# Patient Record
Sex: MALE | Race: WHITE
[De-identification: names, ages, dates, MRNs, and addresses within clinical notes are randomized per-mention and may not be internally consistent; named-entity substitution may affect disease eponyms.]

---

## 2019-11-05 ENCOUNTER — HOSPITAL ENCOUNTER (EMERGENCY)
Dept: HOSPITAL 97 - ER | Age: 39
Discharge: HOME | End: 2019-11-05
Payer: SELF-PAY

## 2019-11-05 VITALS — OXYGEN SATURATION: 100 %

## 2019-11-05 VITALS — DIASTOLIC BLOOD PRESSURE: 89 MMHG | SYSTOLIC BLOOD PRESSURE: 161 MMHG

## 2019-11-05 VITALS — TEMPERATURE: 97.9 F

## 2019-11-05 DIAGNOSIS — I10: ICD-10-CM

## 2019-11-05 DIAGNOSIS — M54.16: Primary | ICD-10-CM

## 2019-11-05 DIAGNOSIS — F17.210: ICD-10-CM

## 2019-11-05 LAB
ALBUMIN SERPL BCP-MCNC: 3.6 G/DL (ref 3.4–5)
ALP SERPL-CCNC: 82 U/L (ref 45–117)
ALT SERPL W P-5'-P-CCNC: 23 U/L (ref 12–78)
AST SERPL W P-5'-P-CCNC: 14 U/L (ref 15–37)
BUN BLD-MCNC: 14 MG/DL (ref 7–18)
GLUCOSE SERPLBLD-MCNC: 94 MG/DL (ref 74–106)
HCT VFR BLD CALC: 49.1 % (ref 39.6–49)
LYMPHOCYTES # SPEC AUTO: 3.2 K/UL (ref 0.7–4.9)
PMV BLD: 8 FL (ref 7.6–11.3)
POTASSIUM SERPL-SCNC: 3.8 MMOL/L (ref 3.5–5.1)
RBC # BLD: 5.62 M/UL (ref 4.33–5.43)

## 2019-11-05 PROCEDURE — 99284 EMERGENCY DEPT VISIT MOD MDM: CPT

## 2019-11-05 PROCEDURE — 80048 BASIC METABOLIC PNL TOTAL CA: CPT

## 2019-11-05 PROCEDURE — 96374 THER/PROPH/DIAG INJ IV PUSH: CPT

## 2019-11-05 PROCEDURE — 85025 COMPLETE CBC W/AUTO DIFF WBC: CPT

## 2019-11-05 PROCEDURE — 72131 CT LUMBAR SPINE W/O DYE: CPT

## 2019-11-05 PROCEDURE — 80076 HEPATIC FUNCTION PANEL: CPT

## 2019-11-05 PROCEDURE — 36415 COLL VENOUS BLD VENIPUNCTURE: CPT

## 2019-11-05 PROCEDURE — 96375 TX/PRO/DX INJ NEW DRUG ADDON: CPT

## 2019-11-05 NOTE — RAD REPORT
EXAM DESCRIPTION:  CT - Spine Lumbar Wo Con - 11/5/2019 4:13 pm

 

CLINICAL HISTORY:  Back pain, right lower extremity radiculopathy

 

COMPARISON:  None.

 

TECHNIQUE:  Thin section axial imaging of the lumbar spine was performed.  Sagittal and coronal recon
struction images were generated and reviewed.

 

All CT scans are performed using dose optimization technique as appropriate and may include automated
 exposure control or mA/KV adjustment according to patient size.

 

FINDINGS:  Lumbar bodies are normal in height and normal in alignment. No lytic, sclerotic or expansi
le destructive process. No paraspinal soft tissue mass.

 

Central canal detail is inherently limited.

 

L1-2 level shows minimal disc bulge. No canal or foramen stenosis. Facet degenerative change present.


 

L2-3 level shows mild broad-based bulging of disc material. No canal or foramen stenosis. Facet degen
erative changes mild.

 

L3-4 level shows mild circumferential bulging of disc material. No canal or foramen stenosis. Mild fa
cet degenerative change.

 

L4-5 level shows large broad-based protrusion of disc material across the central canal and into each
 exit foramen. The protruding disc is calcified. No central spinal stenosis. Mild bilateral foraminal
 stenosis changes are present from bulging disc material and facet degenerative change.

 

L5-S1 large disc herniation. Disc herniation is calcified. There is no central spinal stenosis. Mild 
mass effect on the bilateral S1 nerve root seen. Foraminal stenosis is present from bulging disc mate
rial and facet hypertrophy.

 

IMPRESSION:  L5-S1 large midline disc herniation. The disc is calcified indicating chronicity. No kedar
tral spinal stenosis.

 

Prominent protruding disc in the midline with calcification also indicating long-standing. No central
 spinal stenosis.

 

L4-5 and L5-S1 foraminal encroachment changes from disc bulge, endplate spurring and facet degenerati
ve change.

## 2019-11-05 NOTE — EDPHYS
Physician Documentation                                                                           

 [facility]                                                                 

Name: Kain Hernandes                                                                                  

Age: 39 yrs                                                                                       

Sex: Male                                                                                         

: 1980                                                                                   

MRN: [id number]                                                                                   

Arrival Date: 2019                                                                          

Time: 14:47                                                                                       

Account#: [id number]                                                                            

Bed 18                                                                                            

Private MD:                                                                                       

ED Physician Gopal West                                                                      

HPI:                                                                                              

                                                                                             

18:37 This 39 yrs old  Male presents to ER via Wheelchair with complaints of Back    wa  

      Pain, Leg Pain.                                                                             

18:38 The patient presents with pain that is acute, with no known mechanism of injury. The    wa  

      symptoms are located in the low back, right. Onset: The symptoms/episode began/occurred     

      3 day(s) ago. The pain radiates to the right leg. Associated signs and symptoms:            

      Pertinent positives: numbness, tingling, Pertinent negatives: abdominal pain, dysuria,      

      hematuria, urinary retention, weakness. The problem was sustained from unknown cause.       

      Modifying factors: The patient symptoms are alleviated by nothing, the patient symptoms     

      are aggravated by movement, walking. Severity of symptoms: At their worst the symptoms      

      were severe, in the emergency department the symptoms are unchanged. The patient has        

      not experienced similar symptoms in the past. The patient has not recently seen a           

      physician. denies incontinence. denies inability ambulating.                                

                                                                                                  

Historical:                                                                                       

- Allergies:                                                                                      

14:51 No Known Allergies;                                                                     hb  

- Home Meds:                                                                                      

14:51 losartan oral oral [Active];                                                            hb  

- PMHx:                                                                                           

14:51 Hypertension;                                                                           hb  

- PSHx:                                                                                           

14:51 None;                                                                                   hb  

                                                                                                  

- Immunization history:: Adult Immunizations up to date.                                          

- Social history:: Smoking status: Patient uses tobacco products, smokes one pack                 

  cigarettes per day.                                                                             

- Ebola Screening: : No symptoms or risks identified at this time.                                

- Family history:: not pertinent.                                                                 

- Hospitalizations: : No recent hospitalization is reported.                                      

                                                                                                  

                                                                                                  

ROS:                                                                                              

18:40 Constitutional: Negative for fever, chills, and weight loss, Eyes: Negative for injury, wa  

      pain, redness, and discharge, ENT: Negative for injury, pain, and discharge, Neck:          

      Negative for injury, pain, and swelling, Cardiovascular: Negative for chest pain,           

      palpitations, and edema, Respiratory: Negative for shortness of breath, cough,              

      wheezing, and pleuritic chest pain, Abdomen/GI: Negative for abdominal pain, nausea,        

      vomiting, diarrhea, and constipation, : Negative for injury, bleeding, discharge, and     

      swelling, MS/Extremity: Negative for injury and deformity, Skin: Negative for injury,       

      rash, and discoloration, Psych: Negative for depression, anxiety, suicide ideation,         

      homicidal ideation, and hallucinations.                                                     

18:40 All other systems are negative.                                                             

18:41 Back: Positive for pain at rest, pain with movement, radiated pain, of the right low    wa  

      back and right gluteus steve.                                                             

                                                                                                  

Exam:                                                                                             

18:41 Constitutional:  This is a well developed, well nourished patient who is awake, alert,  wa  

      and in no acute distress. Head/Face:  Normocephalic, atraumatic. Eyes:  Pupils equal        

      round and reactive to light, extra-ocular motions intact.  Lids and lashes normal.          

      Conjunctiva and sclera are non-icteric and not injected.  Cornea within normal limits.      

      Periorbital areas with no swelling, redness, or edema. ENT:  Nares patent. No nasal         

      discharge, no septal abnormalities noted.  Tympanic membranes are normal and external       

      auditory canals are clear.  Oropharynx with no redness, swelling, or masses, exudates,      

      or evidence of obstruction, uvula midline.  Mucous membranes moist. Neck:  Trachea          

      midline, no thyromegaly or masses palpated, and no cervical lymphadenopathy.  Supple,       

      full range of motion without nuchal rigidity, or vertebral point tenderness.  No            

      Meningismus. Chest/axilla:  Normal chest wall appearance and motion.  Nontender with no     

      deformity.  No lesions are appreciated. Cardiovascular:  Regular rate and rhythm with a     

      normal S1 and S2.  No gallops, murmurs, or rubs.  Normal PMI, no JVD.  No pulse             

      deficits. Respiratory:  Lungs have equal breath sounds bilaterally, clear to                

      auscultation and percussion.  No rales, rhonchi or wheezes noted.  No increased work of     

      breathing, no retractions or nasal flaring. Abdomen/GI:  Soft, non-tender, with normal      

      bowel sounds.  No distension or tympany.  No guarding or rebound.  No evidence of           

      tenderness throughout. Male :  Normal genitalia with no discharge or lesions. Skin:       

      Warm, dry with normal turgor.  Normal color with no rashes, no lesions, and no evidence     

      of cellulitis. MS/ Extremity:  Pulses equal, no cyanosis.  Neurovascular intact.  Full,     

      normal range of motion.                                                                     

18:41 Back: pain, that is moderate, of the  right low back and right gluteus steve, CVA         

      tenderness, is absent, muscle spasm, is not present, Straight leg raises: partially         

      positive on the R?.                                                                         

18:41 Neuro: RLE numbness, tingling.                                                              

                                                                                                  

Vital Signs:                                                                                      

14:51  / 119; Pulse 98; Resp 20; Temp 97.9; Pulse Ox 100% on R/A; Weight 136.08 kg;     hb  

      Height 6 ft. 3 in. (190.50 cm); Pain 10/10;                                                 

16:40  / 111; Pulse 65; Resp 17 S; Pulse Ox 95% on R/A;                                 ca1 

17:32  / 91; Pulse 74; Resp 16 S; Pulse Ox 100% on R/A;                                 ca1 

18:10  / 89; Pulse 81; Resp 17 S; Pulse Ox 100% on R/A; Pain 5/10;                      ca1 

14:51 Body Mass Index 37.50 (136.08 kg, 190.50 cm)                                            hb  

                                                                                                  

MDM:                                                                                              

14:55 Patient medically screened.                                                             wa  

18:45 Differential diagnosis: Ligament Injury sprain, sciatica. lumbar canal stenosis, disk   wa  

      herniation, nerve root impingement. Data reviewed: vital signs, nurses notes, lab test      

      result(s), radiologic studies.                                                              

18:47 Test interpretation: by ED physician or midlevel provider: lumbar spine CT: multi-level wa  

      disk herniation. no significant canal stenosis.                                             

18:49 Test interpretation: by ED physician or midlevel provider: labs noted essentially       wa  

      within nml limits. Response to treatment: the patient's symptoms have markedly improved     

      after treatment. ED course: received meds for pain in ED with significant relief. d/c'd     

      with close f/u for MRI and further eval. advised return for worsening concerns.             

                                                                                                  

                                                                                             

15:57 Order name: Basic Metabolic Panel; Complete Time: 17:08                                 wa  

                                                                                             

15:57 Order name: CBC with Diff; Complete Time: 17:09                                         wa  

                                                                                             

15:54 Order name: CT Lumbar Spine Wo Con; Complete Time: 17:09                                wa  

                                                                                             

15:57 Order name: Hepatic Function; Complete Time: 17:                                      wa  

                                                                                             

15:54 Order name: IV Start; Complete Time: 16:12                                              wa  

                                                                                             

15:57 Order name: Labs collected and sent; Complete Time: 16:23                               wa  

                                                                                                  

Administered Medications:                                                                         

16:00 Drug: Decadron - Dexamethasone 10 mg Route: IVP; Site: right antecubital;               ca1 

16:23 Follow up: Response: No adverse reaction; Pain is decreased                             ca1 

16:02 Drug: TORadol 30 mg Route: IVP; Site: right antecubital;                                ca1 

16:23 Follow up: Response: No adverse reaction; Pain is decreased                             ca1 

16:05 Drug: Valium 5 mg Route: IVP; Site: right antecubital;                                  ca1 

16:23 Follow up: Response: No adverse reaction; Pain is decreased                             ca1 

17:15 Drug: fentaNYL (PF) 100 mcg {Note: RASS - 0.} Route: IVP; Site: right antecubital;      ca1 

17:45 Follow up: Response: No adverse reaction; Pain is decreased; RASS: Alert and Calm (0)   ca1 

                                                                                                  

                                                                                                  

Disposition:                                                                                      

19 18:01 Discharged to Home. Impression: acute right lower back pain with radiculopathy.    

- Condition is Stable.                                                                            

                                                                                                  

- Prescriptions for Valium 5 mg Oral Tablet - take 1 tablet by ORAL route At bedtime As           

  needed; 5 tablet. Prednisone 20 mg Oral Tablet - take 2 tablet by ORAL route once               

  daily for 5 days; 10 tablet. Ibuprofen 600 mg Oral Tablet - take 1 tablet by ORAL               

  route every 6 hours As needed take with food; 30 tablet.                                        

- Medication Reconciliation Form, Thank You Letter, Antibiotic Education, Prescription            

  Opioid Use, Work release form form.                                                             

- Follow up: Olvin Watts MD; When: 1 - 2 days.                                             

- Problem is new.                                                                                 

- Symptoms have improved.                                                                         

- Notes: follow up with the bone doctor as discussed. you will need an MRI for further            

  evaluation of your pain. return for any rapidly worsening concerns you may have                 

                                                                                                  

                                                                                                  

Signatures:                                                                                       

Dispatcher [employer]                           EDCinthia Arechiga RN                     RN                                                      

Gopal eWst MD MD wa Acob, Cheryl, RN                        RN   ca1                                                  

                                                                                                  

Corrections: (The following items were deleted from the chart)                                    

18:19 18:01 2019 18:01 Discharged to Home. Impression: acute right lower back pain with ca1 

      radiculopathy. Condition is Stable. Forms are Medication Reconciliation Form, Thank You     

      Letter, Antibiotic Education, Prescription Opioid Use. Follow up: Olvin Watts;         

      When: 1 - 2 days. Problem is new. Symptoms have improved. wa                                

                                                                                                  

**************************************************************************************************

## 2019-11-05 NOTE — ER
Nurse's Notes                                                                                     

 [facility]                                                                 

Name: Kain Hernandes                                                                                  

Age: 39 yrs                                                                                       

Sex: Male                                                                                         

: 1980                                                                                   

MRN: [id number]                                                                                   

Arrival Date: 2019                                                                          

Time: 14:47                                                                                       

Account#: [id number]                                                                            

Bed 18                                                                                            

Private MD:                                                                                       

Diagnosis: acute right lower back pain with radiculopathy                                         

                                                                                                  

Presentation:                                                                                     

                                                                                             

14:48 Presenting complaint: Right low back pain that radiates to right leg x 3 days.          hb  

      Transition of care: patient was not received from another setting of care. Onset of         

      symptoms was 2019. Risk Assessment: Do you want to hurt yourself or            

      someone else? Patient reports no desire to harm self or others. Care prior to arrival:      

      None.                                                                                       

14:48 Method Of Arrival: Wheelchair                                                           hb  

14:48 Acuity: ALE 3                                                                           hb  

15:21 Initial Sepsis Screen: Does the patient meet any 2 criteria? No. Patient's initial      ca1 

      sepsis screen is negative. Does the patient have a suspected source of infection? No.       

      Patient's initial sepsis screen is negative.                                                

                                                                                                  

Historical:                                                                                       

- Allergies:                                                                                      

14:51 No Known Allergies;                                                                     hb  

- Home Meds:                                                                                      

14:51 losartan oral oral [Active];                                                            hb  

- PMHx:                                                                                           

14:51 Hypertension;                                                                           hb  

- PSHx:                                                                                           

14:51 None;                                                                                   hb  

                                                                                                  

- Immunization history:: Adult Immunizations up to date.                                          

- Social history:: Smoking status: Patient uses tobacco products, smokes one pack                 

  cigarettes per day.                                                                             

- Ebola Screening: : No symptoms or risks identified at this time.                                

- Family history:: not pertinent.                                                                 

- Hospitalizations: : No recent hospitalization is reported.                                      

                                                                                                  

                                                                                                  

Screening:                                                                                        

15:18 Abuse screen: Denies threats or abuse. Denies injuries from another. Nutritional        ca1 

      screening: No deficits noted. Tuberculosis screening: No symptoms or risk factors           

      identified. Fall Risk None identified.                                                      

                                                                                                  

Assessment:                                                                                       

15:18 General: Appears in no apparent distress. uncomfortable, Behavior is calm, cooperative, ca1 

      appropriate for age. Pain: Complains of pain in right lower back and right gluteus          

      steve Pain radiates to right leg Pain currently is 10 out of 10 on a pain scale.          

      Quality of pain is described as sharp, shooting, Pain began 2-3 days ago. Is                

      continuous, Aggravated by weight bearing. Neuro: Level of Consciousness is awake,           

      alert, obeys commands, Oriented to person, place, time, situation, Appropriate for age.     

      Cardiovascular: Heart tones S1 S2 present Capillary refill < 3 seconds Patient's skin       

      is warm and dry. Cardiovascular: Pulses are all present. Cardiovascular: Edema is           

      absent. Respiratory: Airway is patent Respiratory effort is even, unlabored,                

      Respiratory pattern is regular, symmetrical. GI: Abdomen is round non-distended, Bowel      

      sounds present X 4 quads. Abd is soft and non tender X 4 quads. : No deficits noted.      

      No signs and/or symptoms were reported regarding the genitourinary system. EENT: No         

      deficits noted. No signs and/or symptoms were reported regarding the EENT system. Derm:     

      Skin is intact, is healthy with good turgor, Skin is pink, warm \T\ dry. Musculoskeletal:   

      Circulation, motion, and sensation intact. Capillary refill < 3 seconds, Range of           

      motion: intact in all extremities.                                                          

16:05 Reassessment: Patient appears in no apparent distress at this time. Patient and/or      ca1 

      family updated on plan of care and expected duration. Pain level reassessed. Patient is     

      alert, oriented x 3, equal unlabored respirations, skin warm/dry/pink.                      

17:20 Reassessment: Patient appears in no apparent distress at this time. Patient is alert,   ca1 

      oriented x 3, equal unlabored respirations, skin warm/dry/pink. Pt c/o pain. Notified       

      provider. Orders given.                                                                     

17:53 Reassessment: Dr. Wets at bedside.                                                    ca1 

18:10 Reassessment: Patient appears in no apparent distress at this time. Patient is alert,   ca1 

      oriented x 3, equal unlabored respirations, skin warm/dry/pink. Patient states feeling      

      better.                                                                                     

                                                                                                  

Vital Signs:                                                                                      

14:51  / 119; Pulse 98; Resp 20; Temp 97.9; Pulse Ox 100% on R/A; Weight 136.08 kg;     hb  

      Height 6 ft. 3 in. (190.50 cm); Pain 10/10;                                                 

16:40  / 111; Pulse 65; Resp 17 S; Pulse Ox 95% on R/A;                                 ca1 

17:32  / 91; Pulse 74; Resp 16 S; Pulse Ox 100% on R/A;                                 ca1 

18:10  / 89; Pulse 81; Resp 17 S; Pulse Ox 100% on R/A; Pain 5/10;                      ca1 

14:51 Body Mass Index 37.50 (136.08 kg, 190.50 cm)                                              

                                                                                                  

ED Course:                                                                                        

14:47 Patient arrived in ED.                                                                  mr  

14:50 Triage completed.                                                                       hb  

14:51 Arm band placed on.                                                                     hb  

14:54 vE Vila RN is Primary Nurse.                                                      ca1 

14:55 Gopal West MD is Attending Physician.                                             wa  

15:18 Patient has correct armband on for positive identification. Bed in low position. Call   ca1 

      light in reach. Side rails up X 1. Pulse ox on. NIBP on. Warm blanket given.                

15:18 No provider procedures requiring assistance completed.                                  ca1 

16:00 Inserted saline lock: 20 gauge in right antecubital area, using aseptic technique.      ca1 

16:14 CT Lumbar Spine Wo Con In Process Unspecified.                                          EDMS

18:01 Olvin Watts MD is Referral Physician.                                            wa  

18:18 IV discontinued, intact, bleeding controlled, No redness/swelling at site. Pressure     ca1 

      dressing applied.                                                                           

                                                                                                  

Administered Medications:                                                                         

16:00 Drug: Decadron - Dexamethasone 10 mg Route: IVP; Site: right antecubital;               ca1 

16:23 Follow up: Response: No adverse reaction; Pain is decreased                             ca1 

16:02 Drug: TORadol 30 mg Route: IVP; Site: right antecubital;                                ca1 

16:23 Follow up: Response: No adverse reaction; Pain is decreased                             ca1 

16:05 Drug: Valium 5 mg Route: IVP; Site: right antecubital;                                  ca1 

16:23 Follow up: Response: No adverse reaction; Pain is decreased                             ca1 

17:15 Drug: fentaNYL (PF) 100 mcg {Note: RASS - 0.} Route: IVP; Site: right antecubital;      ca1 

17:45 Follow up: Response: No adverse reaction; Pain is decreased; RASS: Alert and Calm (0)   ca1 

                                                                                                  

                                                                                                  

Outcome:                                                                                          

18:01 Discharge ordered by MD.                                                                wa  

18:18 Discharged to home via wheelchair, with family.                                         ca1 

18:18 Condition: stable                                                                           

18:18 Discharge instructions given to patient, Instructed on discharge instructions, follow       

      up and referral plans. no drinking with medication, no driving heavy equipment,             

      medication usage, Demonstrated understanding of instructions, follow-up care,               

      medications, Prescriptions given X 3.                                                       

18:19 Patient left the ED.                                                                    ca1 

                                                                                                  

Signatures:                                                                                       

Dispatcher [employer]                           [facility]                                                 

Sadie Fan Heather, RN RN                                                      

Brett, MD MD tapan Herron Cheryl RN                        RN   ca1                                                  

                                                                                                  

Corrections: (The following items were deleted from the chart)                                    

18:18 18:10  / 89; Pulse 81bpm; Resp 17bpm; Spontaneous; Pulse Ox 100% RA; Pain 6/10;   ca1 

      ca1                                                                                         

                                                                                                  

**************************************************************************************************

## 2019-11-07 ENCOUNTER — HOSPITAL ENCOUNTER (EMERGENCY)
Dept: HOSPITAL 97 - ER | Age: 39
Discharge: HOME | End: 2019-11-07
Payer: SELF-PAY

## 2019-11-07 VITALS — SYSTOLIC BLOOD PRESSURE: 171 MMHG | DIASTOLIC BLOOD PRESSURE: 105 MMHG | OXYGEN SATURATION: 100 %

## 2019-11-07 VITALS — TEMPERATURE: 97 F

## 2019-11-07 DIAGNOSIS — M54.41: Primary | ICD-10-CM

## 2019-11-07 PROCEDURE — 96365 THER/PROPH/DIAG IV INF INIT: CPT

## 2019-11-07 PROCEDURE — 99283 EMERGENCY DEPT VISIT LOW MDM: CPT

## 2019-11-07 PROCEDURE — 96375 TX/PRO/DX INJ NEW DRUG ADDON: CPT

## 2019-11-07 NOTE — ER
Nurse's Notes                                                                                     

 [facility]                                                                 

Name: Kain Hernandes                                                                                  

Age: 39 yrs                                                                                       

Sex: Male                                                                                         

: 1980                                                                                   

MRN: [id number]                                                                                   

Arrival Date: 2019                                                                          

Time: 10:40                                                                                       

Account#: [id number]                                                                            

Bed 8                                                                                             

Private MD:                                                                                       

Diagnosis: Lumbago with sciatica, right side                                                      

                                                                                                  

Presentation:                                                                                     

                                                                                             

10:57 Presenting complaint: Patient states: right buttock pain that radiates down to the RLE, sv  

      reports being seen here 2 days ago for the same thing, denies injury or fall. Reports       

      that he went to the restroom and sat down and when he sat back up the pain started          

      again. Denies urinary problems. Transition of care: patient was not received from           

      another setting of care. Onset of symptoms was 2019. Risk Assessment: Do       

      you want to hurt yourself or someone else? Patient reports no desire to harm self or        

      others. Care prior to arrival: None.                                                        

10:57 Method Of Arrival: Wheelchair                                                           sv  

10:57 Acuity: ALE 3                                                                           sv  

16:00 Initial Sepsis Screen: Does the patient meet any 2 criteria? No. Patient's initial      bp  

      sepsis screen is negative. Does the patient have a suspected source of infection? No.       

      Patient's initial sepsis screen is negative.                                                

                                                                                                  

Triage Assessment:                                                                                

10:57 General: Appears in no apparent distress. uncomfortable, obese, Behavior is             sv  

      cooperative, restless. Pain: Complains of pain in right gluteus steve Pain radiates       

      to right leg. Neuro: Level of Consciousness is awake, alert, obeys commands.                

      Respiratory: Respiratory effort is even, unlabored.                                         

                                                                                                  

Historical:                                                                                       

- Allergies:                                                                                      

10:59 No Known Allergies;                                                                     sv  

- PMHx:                                                                                           

10:59 Hypertension;                                                                           sv  

- PSHx:                                                                                           

10:59 None;                                                                                   sv  

                                                                                                  

- Immunization history:: Adult Immunizations up to date.                                          

- Social history:: Smoking status: Patient/guardian denies using tobacco.                         

- Ebola Screening: : No symptoms or risks identified at this time.                                

                                                                                                  

                                                                                                  

Screenin:55 Abuse screen: Denies threats or abuse. Denies injuries from another. Nutritional        aj1 

      screening: No deficits noted. Tuberculosis screening: No symptoms or risk factors           

      identified.                                                                                 

16:00 Fall Risk None identified.                                                              bp  

                                                                                                  

Assessment:                                                                                       

11:55 General: Appears uncomfortable, Behavior is cooperative, agitated, restless. Pain:      aj1 

      Complains of pain in right gluteus steve Pain radiates to right leg Pain currently is     

      10 out of 10 on a pain scale. Neuro: Level of Consciousness is awake, alert, obeys          

      commands, Oriented to person, place, time, situation. Cardiovascular: Patient's skin is     

      warm and dry. Respiratory: Airway is patent Respiratory effort is even, unlabored,          

      Respiratory pattern is regular, symmetrical. GI: No signs and/or symptoms were reported     

      involving the gastrointestinal system. : No signs and/or symptoms were reported           

      regarding the genitourinary system. EENT: No signs and/or symptoms were reported            

      regarding the EENT system. Derm: No signs and/or symptoms reported regarding the            

      dermatologic system. Skin is pink, warm \T\ dry. normal. Musculoskeletal: Circulation,      

      motion, and sensation intact. Range of motion: intact in all extremities, Denies recent     

      injury.                                                                                     

12:55 Reassessment: Patient and/or family updated on plan of care and expected duration. Pain aj1 

      level reassessed. Patient is resting comfortably, awakens easily to tactile stimuli.        

      Patient states feeling better. Patient states symptoms have improved.                       

13:55 Reassessment: Patient and/or family updated on plan of care and expected duration. Pain aj1 

      level reassessed. General: Appears in no apparent distress. comfortable, Behavior is        

      calm, cooperative, appropriate for age. Neuro: Level of Consciousness is awake, alert,      

      obeys commands, Oriented to person, place, time, situation. Cardiovascular: Patient's       

      skin is warm and dry. Respiratory: Airway is patent Respiratory effort is even,             

      unlabored, Respiratory pattern is regular, symmetrical. Derm: No signs and/or symptoms      

      reported regarding the dermatologic system. Skin is pink, warm \T\ dry. normal.             

      Musculoskeletal: Circulation, motion, and sensation intact.                                 

15:01 Reassessment: Patient appears in no apparent distress at this time. No changes from     aj1 

      previously documented assessment. Patient and/or family updated on plan of care and         

      expected duration. Pain level reassessed. Patient is alert, oriented x 3, equal             

      unlabored respirations, skin warm/dry/pink.                                                 

15:59 Reassessment: PT D/C HOME VIA W/C, DX WITH LUMBAGO.                                     bp  

                                                                                                  

Vital Signs:                                                                                      

10:59  / 128; Pulse 89; Resp 24; Temp 97; Pulse Ox 98% ; Weight 136.08 kg; Height 6 ft. sv  

      3 in. (190.50 cm);                                                                          

11:55  / 121; Pulse 77; Resp 18; Pulse Ox 97% on R/A;                                   aj1 

13:27  / 94; Pulse 53; Resp 16 S; Pulse Ox 96% ;                                        aj1 

14:30  / 105; Pulse 64; Resp 18; Pulse Ox 100% on R/A;                                  aj1 

10:59 Body Mass Index 37.50 (136.08 kg, 190.50 cm)                                            sv  

                                                                                                  

ED Course:                                                                                        

10:40 Patient arrived in ED.                                                                  as  

10:58 Triage completed.                                                                       sv  

10:59 Arm band placed on.                                                                     sv  

11:52 Ronald Morales PA is PHCP.                                                               [id number] 

11:52 Bhupendra Rome MD is Attending Physician.                                              [id number] 

11:55 Maria Isabel Jang RN is Primary Nurse.                                                   aj1 

11:55 Patient has correct armband on for positive identification. Bed in low position. Call   aj1 

      light in reach. Side rails up X 1.                                                          

11:55 No provider procedures requiring assistance completed.                                  aj1 

12:00 Inserted saline lock: 20 gauge in right antecubital area, using aseptic technique.      aj1 

15:59 IV discontinued, intact, bleeding controlled, No redness/swelling at site. Pressure     bp  

      dressing applied.                                                                           

                                                                                                  

Administered Medications:                                                                         

12:01 Not Given (Physician Discretion): Valium 2 mg IVP once                                  [id number] 

12:11 Drug: TORadol - Ketorolac 15 mg Route: IVP; Site: right antecubital;                    aj1 

16:01 Follow up: Response: Pain is decreased                                                  bp  

12:11 Drug: Decadron - Dexamethasone 10 mg Route: IVP; Site: right antecubital;               aj1 

16:01 Follow up: Response: No adverse reaction                                                bp  

12:11 Drug: fentaNYL (PF) 100 mcg Route: IVP; Site: right antecubital;                        aj1 

16:01 Follow up: Response: Pain is decreased                                                  bp  

12:22 Drug: Robaxin 1 grams Route: IVPB; Infused Over: 1 hrs; Site: right antecubital;        aj1 

13:00 Follow up: IV Status: Completed infusion; IV Intake: 50ml                               bp  

14:42 Drug: fentaNYL (PF) 100 mcg {Note: RASS score 0 patient is alert.} Route: IVP; Site:    aj1 

      right antecubital;                                                                          

16:01 Follow up: Response: Pain is decreased                                                  bp  

14:43 Drug: Gabapentin 600 mg Route: PO;                                                      aj1 

16:02 Follow up: Response: No adverse reaction                                                bp  

                                                                                                  

                                                                                                  

Intake:                                                                                           

13:00 IV: 50ml; Total: 50ml.                                                                  bp  

                                                                                                  

Outcome:                                                                                          

15:39 Discharge ordered by MD. ellis 

15:59 Discharged to home via wheelchair.                                                      bp  

15:59 Condition: stable                                                                           

15:59 Discharge instructions given to patient, Instructed on discharge instructions, follow       

      up and referral plans. medication usage, Demonstrated understanding of instructions,        

      follow-up care, medications, Prescriptions given X 1.                                       

16:02 Patient left the ED.                                                                    sv  

                                                                                                  

Signatures:                                                                                       

Maria Isabel Jang RN                     RN   aj1                                                  

Rebecca Cedeño RN                    RN   sv                                                   

Mitzi Guido Josh, PA                        PA   jr8                                                  

Christopher Pepe RN                      RN   bp                                                   

                                                                                                  

Corrections: (The following items were deleted from the chart)                                    

10:59 10:57 Acuity: ALE 4 sv                                                                  sv  

11:00 10:57 Acuity: ALE 2 sv                                                                  sv  

                                                                                                  

**************************************************************************************************

## 2019-11-07 NOTE — EDPHYS
Physician Documentation                                                                           

 [facility]                                                                 

Name: Kain Hernandes                                                                                  

Age: 39 yrs                                                                                       

Sex: Male                                                                                         

: 1980                                                                                   

MRN: [id number]                                                                                   

Arrival Date: 2019                                                                          

Time: 10:40                                                                                       

Account#: [id number]                                                                            

Bed 8                                                                                             

Private MD:                                                                                       

ED Physician Bhupendra Rome                                                                       

HPI:                                                                                              

                                                                                             

13:11 This 39 yrs old  Male presents to ER via Wheelchair with complaints of         jr8 

      Sciatica.                                                                                   

13:11 The patient presents with pain that is acute. The symptoms are located in the low back. jr8 

      The pain radiates to the right leg. The problem was sustained standing up. Onset: The       

      symptoms/episode began/occurred acutely, today. Modifying factors: The patient symptoms     

      are alleviated by nothing, the patient symptoms are aggravated by any movement.             

      Associated signs and symptoms: The patient has no apparent associated signs or              

      symptoms. Severity of symptoms: At their worst the symptoms were moderate, in the           

      emergency department the symptoms are unchanged. The patient has experienced similar        

      episodes in the past, a few times. The patient has been recently seen at the [facility], this week, for similar complaints labs         

      were performed, CT scan was performed, was given a prescription for pain medications,       

      the patient was told to return for a recheck. Stated that he pulled his back and            

      started to have radiating pain a few days ago. Came here and felt better. Today was         

      getting off of the toilet and pulled back again. Pain since then that will not go away .    

                                                                                                  

Historical:                                                                                       

- Allergies:                                                                                      

10:59 No Known Allergies;                                                                     sv  

- PMHx:                                                                                           

10:59 Hypertension;                                                                           sv  

- PSHx:                                                                                           

10:59 None;                                                                                   sv  

                                                                                                  

- Immunization history:: Adult Immunizations up to date.                                          

- Social history:: Smoking status: Patient/guardian denies using tobacco.                         

- Ebola Screening: : No symptoms or risks identified at this time.                                

                                                                                                  

                                                                                                  

ROS:                                                                                              

13:11 Eyes: Negative for injury, pain, redness, and discharge, ENT: Negative for injury,      jr8 

      pain, and discharge, Neck: Negative for injury, pain, and swelling, Cardiovascular:         

      Negative for chest pain, palpitations, and edema, Respiratory: Negative for shortness       

      of breath, cough, wheezing, and pleuritic chest pain, Abdomen/GI: Negative for              

      abdominal pain, nausea, vomiting, diarrhea, and constipation, MS/Extremity: Negative        

      for injury and deformity, Skin: Negative for injury, rash, and discoloration, Neuro:        

      Negative for headache, weakness, numbness, tingling, and seizure.                           

13:11 Back: Positive for pain at rest, pain with movement, radiated pain, of the right low        

      back.                                                                                       

                                                                                                  

Exam:                                                                                             

13:11 Cardiovascular:  Regular rate and rhythm with a normal S1 and S2.  No gallops, murmurs, jr8 

      or rubs.  Normal PMI, no JVD.  No pulse deficits. Respiratory:  Lungs have equal breath     

      sounds bilaterally, clear to auscultation and percussion.  No rales, rhonchi or wheezes     

      noted.  No increased work of breathing, no retractions or nasal flaring. Abdomen/GI:        

      Soft, non-tender, with normal bowel sounds.  No distension or tympany.  No guarding or      

      rebound.  No evidence of tenderness throughout. Skin:  Warm, dry with normal turgor.        

      Normal color with no rashes, no lesions, and no evidence of cellulitis. MS/ Extremity:      

      Pulses equal, no cyanosis.  Neurovascular intact.  Full, normal range of motion. Neuro:     

       Awake and alert, GCS 15, oriented to person, place, time, and situation.  Cranial          

      nerves II-XII grossly intact.  Motor strength 5/5 in all extremities.  Sensory grossly      

      intact.  Cerebellar exam normal.  Normal gait.                                              

13:11 Back: pain, that is moderate, of the  right low back, ROM is painful, normal spinal         

      alignment noted, CVA tenderness, is absent, Straight leg raises: right lower extremity      

      illicits pain, at 15 degrees.                                                               

                                                                                                  

Vital Signs:                                                                                      

10:59  / 128; Pulse 89; Resp 24; Temp 97; Pulse Ox 98% ; Weight 136.08 kg; Height 6 ft. sv  

      3 in. (190.50 cm);                                                                          

11:55  / 121; Pulse 77; Resp 18; Pulse Ox 97% on R/A;                                   aj1 

13:27  / 94; Pulse 53; Resp 16 S; Pulse Ox 96% ;                                        aj1 

14:30  / 105; Pulse 64; Resp 18; Pulse Ox 100% on R/A;                                  aj1 

10:59 Body Mass Index 37.50 (136.08 kg, 190.50 cm)                                            sv  

                                                                                                  

MDM:                                                                                              

11:57 Patient medically screened.                                                             jr8 

15:38 Data reviewed: vital signs, nurses notes, and as a result, I will discharge patient.    jr8 

      Data interpreted: Pulse oximetry: on room air is 100 %. Interpretation: normal.             

      Counseling: I had a detailed discussion with the patient and/or guardian regarding: the     

      historical points, exam findings, and any diagnostic results supporting the                 

      discharge/admit diagnosis, the need for outpatient follow up, a neurosurgeon, a             

      orthopedic surgeon, to return to the emergency department if symptoms worsen or persist     

      or if there are any questions or concerns that arise at home. Response to treatment:        

      the patient's symptoms have markedly improved after treatment.                              

                                                                                                  

                                                                                             

11:56 Order name: IV; Complete Time: :8 

                                                                                                  

Administered Medications:                                                                         

12: Not Given (Physician Discretion): Valium 2 mg IVP once                                  jr8 

12:11 Drug: TORadol - Ketorolac 15 mg Route: IVP; Site: right antecubital;                    [facility] 

16:01 Follow up: Response: Pain is decreased                                                  bp  

12:11 Drug: Decadron - Dexamethasone 10 mg Route: IVP; Site: right antecubital;               [facility] 

16:01 Follow up: Response: No adverse reaction                                                bp  

12:11 Drug: fentaNYL (PF) 100 mcg Route: IVP; Site: right antecubital;                        aj 

16:01 Follow up: Response: Pain is decreased                                                  bp  

12:22 Drug: Robaxin 1 grams Route: IVPB; Infused Over: 1 hrs; Site: right antecubital;        aj 

13:00 Follow up: IV Status: Completed infusion; IV Intake: 50ml                               bp  

14:42 Drug: fentaNYL (PF) 100 mcg {Note: RASS score 0 patient is alert.} Route: IVP; Site:    [facility] 

      right antecubital;                                                                          

16:01 Follow up: Response: Pain is decreased                                                  bp  

14:43 Drug: Gabapentin 600 mg Route: PO;                                                      [facility] 

16:02 Follow up: Response: No adverse reaction                                                bp  

                                                                                                  

                                                                                                  

Disposition:                                                                                      

                                                                                             

07:29 Co-signature as Attending Physician, Bhupendra Rome MD I agree with the assessment and   kdr 

      plan of care.                                                                               

                                                                                                  

Disposition:                                                                                      

19 15:39 Discharged to Home. Impression: Lumbago with sciatica, right side.                 

- Condition is Stable.                                                                            

- Discharge Instructions: Chronic Back Pain, Sciatica, Back Exercises, Easy-to-Read.              

- Prescriptions for gabapentin 300 mg Oral capsule - take 1 capsule by ORAL route 3               

  times per day; 30 capsule.                                                                      

- Medication Reconciliation Form, Thank You Letter, Antibiotic Education, Prescription            

  Opioid Use, Work release form form.                                                             

- Follow up: Private Physician; When: 1 week; Reason: Recheck today's complaints,                 

  Continuance of care, Re-evaluation by your physician.                                           

- Problem is new.                                                                                 

- Symptoms have improved.                                                                         

                                                                                                  

                                                                                                  

                                                                                                  

Signatures:                                                                                       

Maria Isabel Jang RN                     RN   aj1                                                  

Rebecca Cedeño RN                    RN   sv                                                   

Bhupendra Rome MD MD   [facility]                                                  

Ronald Morales PA PA   jr8                                                  

Christopher Pepe RN                      RN   bp                                                   

                                                                                                  

Corrections: (The following items were deleted from the chart)                                    

                                                                                             

16:02 15:39 2019 15:39 Discharged to Home. Impression: Lumbago with sciatica, right     sv  

      side. Condition is Stable. Forms are Medication Reconciliation Form, Thank You Letter,      

      Antibiotic Education, Prescription Opioid Use. Follow up: Private Physician; When: 1        

      week; Reason: Recheck today's complaints, Continuance of care, Re-evaluation by your        

      physician. Problem is new. Symptoms have improved. jr8                                      

                                                                                                  

**************************************************************************************************